# Patient Record
Sex: FEMALE
[De-identification: names, ages, dates, MRNs, and addresses within clinical notes are randomized per-mention and may not be internally consistent; named-entity substitution may affect disease eponyms.]

---

## 2021-01-20 ENCOUNTER — DASHBOARD ENCOUNTERS (OUTPATIENT)
Age: 46
End: 2021-01-20

## 2021-01-20 ENCOUNTER — OFFICE VISIT (OUTPATIENT)
Dept: URBAN - METROPOLITAN AREA TELEHEALTH 2 | Facility: TELEHEALTH | Age: 46
End: 2021-01-20
Payer: COMMERCIAL

## 2021-01-20 ENCOUNTER — LAB OUTSIDE AN ENCOUNTER (OUTPATIENT)
Dept: URBAN - METROPOLITAN AREA TELEHEALTH 2 | Facility: TELEHEALTH | Age: 46
End: 2021-01-20

## 2021-01-20 DIAGNOSIS — I01.1 RHEUMATOID ARTHITIS: ICD-10-CM

## 2021-01-20 DIAGNOSIS — K59.01 CONSTIPATION: ICD-10-CM

## 2021-01-20 DIAGNOSIS — K21.9 GERD: ICD-10-CM

## 2021-01-20 DIAGNOSIS — Z12.11 COLON CANCER SCREENING: ICD-10-CM

## 2021-01-20 PROBLEM — 18192007 ACUTE RHEUMATIC ENDOCARDITIS: Status: ACTIVE | Noted: 2021-01-20

## 2021-01-20 PROBLEM — 254837009 BREAST CANCER: Status: ACTIVE | Noted: 2021-01-20

## 2021-01-20 PROCEDURE — 99203 OFFICE O/P NEW LOW 30 MIN: CPT | Performed by: INTERNAL MEDICINE

## 2021-01-20 PROCEDURE — 99443 PHONE E/M BY PHYS 21-30 MIN: CPT | Performed by: INTERNAL MEDICINE

## 2021-01-20 RX ORDER — PANTOPRAZOLE SODIUM 40 MG/1
TAKE 1 TABLET (40 MG) BY ORAL ROUTE ONCE DAILY TABLET, DELAYED RELEASE ORAL ONCE A DAY
Qty: 90 TABLET | Refills: 3 | OUTPATIENT
Start: 2021-01-20

## 2021-01-20 RX ORDER — SODIUM, POTASSIUM,MAG SULFATES 17.5-3.13G
354 ML AS DIRECTED SOLUTION, RECONSTITUTED, ORAL ORAL ONCE
Qty: 354 MILLILITER | Refills: 0 | OUTPATIENT
Start: 2021-01-20 | End: 2021-01-21

## 2021-01-20 NOTE — HPI-TODAY'S VISIT:
Lalitha is a 44 YO referred for consultation of bloating, reflux and constipation. She has been diagnosed with breast cancer this year s/p surgery, she declined chemo and radiation along with hysterecomty. She was following with Dr. Debby Ross at [facility] but she has retired and she will be establishing with a new MD. She is on anastrazole and cannibis oil for treatment. She states she is in full blown menopause. She has never had an EGD or Colonoscopy. States over the past year she has devloped increased reflux, nausea, and post prandial bloating. She has a history of RA following with Dr. Preston. She takes tramadol and meloxicam for pain per Dr. Preston for her RA. She is not on a PPI or pepcid regularly but has taken prilosec with some relief, she is also taking tums. She has chronic constipation which she is eating prunes. She is not taking anything for her bowels. She has never had an EGD or Colonoscopy. Otherwise she is not doing well today with multiple GI complaints. MB

## 2021-03-09 PROBLEM — 235595009 GASTROESOPHAGEAL REFLUX DISEASE: Status: ACTIVE | Noted: 2021-01-20

## 2021-03-09 PROBLEM — 14760008 CONSTIPATION: Status: ACTIVE | Noted: 2021-01-20

## 2021-03-09 PROBLEM — 275978004 COLON CANCER SCREENING: Status: ACTIVE | Noted: 2021-01-20

## 2021-03-15 ENCOUNTER — OFFICE VISIT (OUTPATIENT)
Dept: URBAN - NONMETROPOLITAN AREA SURGERY CENTER 1 | Facility: SURGERY CENTER | Age: 46
End: 2021-03-15
Payer: COMMERCIAL

## 2021-03-15 DIAGNOSIS — K59.09 CHRONIC CONSTIPATION: ICD-10-CM

## 2021-03-15 DIAGNOSIS — K22.8 COLUMNAR-LINED ESOPHAGUS: ICD-10-CM

## 2021-03-15 DIAGNOSIS — K29.60 ADENOPAPILLOMATOSIS GASTRICA: ICD-10-CM

## 2021-03-15 PROCEDURE — G8907 PT DOC NO EVENTS ON DISCHARG: HCPCS | Performed by: INTERNAL MEDICINE

## 2021-03-15 PROCEDURE — 45378 DIAGNOSTIC COLONOSCOPY: CPT | Performed by: INTERNAL MEDICINE

## 2021-03-15 PROCEDURE — 43239 EGD BIOPSY SINGLE/MULTIPLE: CPT | Performed by: INTERNAL MEDICINE

## 2021-06-15 ENCOUNTER — OFFICE VISIT (OUTPATIENT)
Dept: URBAN - NONMETROPOLITAN AREA CLINIC 2 | Facility: CLINIC | Age: 46
End: 2021-06-15